# Patient Record
Sex: FEMALE | Race: WHITE
[De-identification: names, ages, dates, MRNs, and addresses within clinical notes are randomized per-mention and may not be internally consistent; named-entity substitution may affect disease eponyms.]

---

## 2019-06-10 ENCOUNTER — HOSPITAL ENCOUNTER (EMERGENCY)
Dept: HOSPITAL 74 - JER | Age: 24
Discharge: HOME | End: 2019-06-10
Payer: COMMERCIAL

## 2019-06-10 VITALS — DIASTOLIC BLOOD PRESSURE: 64 MMHG | HEART RATE: 84 BPM | SYSTOLIC BLOOD PRESSURE: 122 MMHG

## 2019-06-10 VITALS — TEMPERATURE: 98.6 F

## 2019-06-10 VITALS — BODY MASS INDEX: 29.9 KG/M2

## 2019-06-10 DIAGNOSIS — J06.9: ICD-10-CM

## 2019-06-10 DIAGNOSIS — B34.9: ICD-10-CM

## 2019-06-10 DIAGNOSIS — K52.9: Primary | ICD-10-CM

## 2019-06-10 LAB
ALBUMIN SERPL-MCNC: 4.5 G/DL (ref 3.4–5)
ALP SERPL-CCNC: 81 U/L (ref 45–117)
ALT SERPL-CCNC: 32 U/L (ref 13–61)
ANION GAP SERPL CALC-SCNC: 7 MMOL/L (ref 8–16)
APPEARANCE UR: CLEAR
AST SERPL-CCNC: 28 U/L (ref 15–37)
BACTERIA #/AREA URNS HPF: 271 /HPF
BASOPHILS # BLD: 0.6 % (ref 0–2)
BILIRUB SERPL-MCNC: 0.4 MG/DL (ref 0.2–1)
BILIRUB UR STRIP.AUTO-MCNC: NEGATIVE MG/DL
BUN SERPL-MCNC: 7.5 MG/DL (ref 7–18)
CALCIUM SERPL-MCNC: 9.4 MG/DL (ref 8.5–10.1)
CASTS #/AREA URNS LPF: 6 /LPF (ref 0–8)
CHLORIDE SERPL-SCNC: 101 MMOL/L (ref 98–107)
CO2 SERPL-SCNC: 28 MMOL/L (ref 21–32)
COLOR UR: YELLOW
CREAT SERPL-MCNC: 0.9 MG/DL (ref 0.55–1.3)
DEPRECATED RDW RBC AUTO: 13.9 % (ref 11.6–15.6)
EOSINOPHIL # BLD: 0.4 % (ref 0–4.5)
EPITH CASTS URNS QL MICRO: 8.4 /HPF
GLUCOSE SERPL-MCNC: 87 MG/DL (ref 74–106)
HCT VFR BLD CALC: 44.3 % (ref 32.4–45.2)
HGB BLD-MCNC: 14.8 GM/DL (ref 10.7–15.3)
KETONES UR QL STRIP: (no result)
LEUKOCYTE ESTERASE UR QL STRIP.AUTO: NEGATIVE
LYMPHOCYTES # BLD: 22.2 % (ref 8–40)
MCH RBC QN AUTO: 30 PG (ref 25.7–33.7)
MCHC RBC AUTO-ENTMCNC: 33.3 G/DL (ref 32–36)
MCV RBC: 89.9 FL (ref 80–96)
MONOCYTES # BLD AUTO: 20.5 % (ref 3.8–10.2)
NEUTROPHILS # BLD: 56.3 % (ref 42.8–82.8)
NITRITE UR QL STRIP: NEGATIVE
PH UR: 7 [PH] (ref 5–8)
PLATELET # BLD AUTO: 178 K/MM3 (ref 134–434)
PLATELET BLD QL SMEAR: NORMAL
PMV BLD: 9.6 FL (ref 7.5–11.1)
POTASSIUM SERPLBLD-SCNC: 3.6 MMOL/L (ref 3.5–5.1)
PROT SERPL-MCNC: 8.6 G/DL (ref 6.4–8.2)
PROT UR QL STRIP: (no result)
PROT UR QL STRIP: NEGATIVE
RBC # BLD AUTO: 4.93 M/MM3 (ref 3.6–5.2)
RBC # BLD AUTO: 7 /HPF (ref 0–4)
SODIUM SERPL-SCNC: 136 MMOL/L (ref 136–145)
SP GR UR: 1.02 (ref 1.01–1.03)
UROBILINOGEN UR STRIP-MCNC: 1 MG/DL (ref 0.2–1)
WBC # BLD AUTO: 5.4 K/MM3 (ref 4–10)
WBC # UR AUTO: 2 /HPF (ref 0–5)

## 2019-06-10 PROCEDURE — 3E0337Z INTRODUCTION OF ELECTROLYTIC AND WATER BALANCE SUBSTANCE INTO PERIPHERAL VEIN, PERCUTANEOUS APPROACH: ICD-10-PCS | Performed by: EMERGENCY MEDICINE

## 2019-06-10 NOTE — PDOC
History of Present Illness





- General


Chief Complaint: Pain


Stated Complaint: abd pain, sob, diarrhea, fever recent travel


Time Seen by Provider: 06/10/19 12:07


History Source: Patient


Exam Limitations: Clinical Condition





- History of Present Illness


Initial Comments: 





06/10/19 12:25


Patient with no significant past medical history present with complaint of five-

day history of nausea, vomiting, persistent cough with yellow sputum and 

diarrhea after traveling to [place] week ago. Patient reported feeling of 

weakness and whole body aches. Reports tactile fever but never checked her 

temperature. Denies sick contacts. Patient did not take anything for symptoms


Timing/Duration: other (5 days)





Past History





- Past Medical History


Allergies/Adverse Reactions: 


 Allergies











Allergy/AdvReac Type Severity Reaction Status Date / Time


 


No Known Allergies Allergy   Verified 06/10/19 11:40











Home Medications: 


Ambulatory Orders





Mag Hydrox/Al Hydrox/Simeth [Mylanta Suspension -] 30 ml PO Q6H #1 bottle 08/30/ 16 


Ranitidine HCl [Zantac] 150 mg PO DAILY #10 tablet 08/30/16 


Sucralfate [Carafate] 1 gm PO BID #20 tablet 08/30/16 


Benzonatate [Tessalon Pearls -] 100 mg PO TID PRN #21 capsule 06/10/19 


Ipratropium Bromide 2 spray NS BID PRN #1 spray 06/10/19 


Mag Hydrox/Aluminum Hyd/Simeth [Maalox Advanced Suspension] 30 ml PO Q8H PRN #

200 ml 06/10/19 








COPD: No


GI Disorders: Yes (ACID REFLUX)





- Suicide/Smoking/Psychosocial Hx


Smoking Status: No


Smoking History: Never smoked


Number of Cigarettes Smoked Daily: 0


Information on smoking cessation initiated: No


Hx Alcohol Use: No


Drug/Substance Use Hx: No


Substance Use Type: None





**Review of Systems





- Review of Systems


Able to Perform ROS?: Yes


Is the patient limited English proficient: No


Constitutional: Yes: Malaise, Weakness.  No: Chills, Fever


HEENTM: Yes: Symptoms Reported, See HPI, Nose Congestion.  No: Eye Pain, 

Blurred Vision, Tearing, Recent change in vision, Double Vision, Cataracts, Ear 

Pain, Ocular Prothesis, Ear Discharge, Nose Pain, Tinnitus, Nose Bleeding, 

Hearing Loss, Throat Pain, Throat Swelling, Mouth Pain, Dental Problems, 

Difficulty Swallowing, Mouth Swelling, Other


Respiratory: Yes: Symptoms reported, See HPI, Cough, Productive cough (yellow 

sputum).  No: Orthopnea, Shortness of Breath, SOB with Exertion, SOB at Rest, 

Stridor, Wheezing, Hemoptysis, Other


Cardiac (ROS): No: Symptoms Reported, See HPI, Chest Pain, Edema, Irregular 

Heart Rate, Lightheadedness, Palpitations, Syncope, Chest Tightness, Other


ABD/GI: Yes: See HPI, Diarrhea, Nausea, Vomiting, Abdominal cramping (

intermittent cramping pain).  No: Difficulty Swallowing, Poor Appetite, Poor 

Fluid Intake, Rectal Bleeding, Indigestion, Tarry Stools


Neurological: No: Headache, Numbness, Weakness, Dizziness


All Other Systems: Reviewed and Negative





*Physical Exam





- Vital Signs


 Last Vital Signs











Temp Pulse Resp BP Pulse Ox


 


 98.6 F   89   18   139/87   100 


 


 06/10/19 11:38  06/10/19 11:38  06/10/19 11:38  06/10/19 11:38  06/10/19 11:38














- Physical Exam


General Appearance: Yes: Nourished, Appropriately Dressed.  No: Apparent 

Distress





ED Treatment Course





- LABORATORY


CBC & Chemistry Diagram: 


 06/10/19 12:23





 06/10/19 12:23





- RADIOLOGY


Radiology Studies Ordered: 














 Category Date Time Status


 


 CHEST PA & LAT [RAD] Stat Radiology  06/10/19 12:15 Ordered














Medical Decision Making





- Medical Decision Making





06/10/19 12:26


Patient with no significant past medical history present with complaint of five-

day history of nausea, vomiting, persistent cough with yellow sputum and 

diarrhea after traveling to [place] week ago. Patient reported feeling of 

weakness and whole body aches. Reports tactile fever but never checked her 

temperature. Denies sick contacts. Patient did not take anything for symptoms


Clinical exam unremarkable with normal lung exam and no abdominal tenderness. 

Patient no acute distress.


Symptoms likely viral gastroenteritis versus URI versus pneumonia.


CBC, CMP, UA urine culture labs ordered. Urine hCG ordered. Chest x-ray ordered 

to rule out pneumonia. IV hydration with 1 L normal saline ordered. Pepcid 20 

mg IV ordered.


Reassess after lab and imaging


06/10/19 13:54


CBC, CMP, Urine labs wnl. CXR with  no acute pathology. uriine hcg negative.


Patient report feeling better after IV hydration. Patient symptoms likely viral 

syndrome. Patient stable for outpatient management with increased hydration, 

tesson perles for cough with PCP f/u. 








*DC/Admit/Observation/Transfer


Diagnosis at time of Disposition: 


 Gastroenteritis, Viral syndrome





URI (upper respiratory infection)


Qualifiers:


 URI type: unspecified URI Qualified Code(s): J06.9 - Acute upper respiratory 

infection, unspecified








- Discharge Dispostion


Disposition: HOME


Condition at time of disposition: Stable


Decision to Admit order: No





- Prescriptions


Prescriptions: 


Benzonatate [Tessalon Pearls -] 100 mg PO TID PRN #21 capsule


 PRN Reason: Cough


Ipratropium Bromide 2 spray NS BID PRN #1 spray


 PRN Reason: nasal congestion


Mag Hydrox/Aluminum Hyd/Simeth [Maalox Advanced Suspension] 30 ml PO Q8H PRN #

200 ml


 PRN Reason: abdominal discomfort





- Referrals





- Patient Instructions


Printed Discharge Instructions:  DI for Viral Gastroenteritis -- Adult, DI for 

Viral Upper Respiratory Infection -- Adult, Gastroenteritis Diet


Additional Instructions: 


Take medications as prescribed. Increase fluid intake. Follow-up with PCP in 

few days for reassessment 





- Post Discharge Activity

## 2019-06-11 NOTE — EKG
Test Reason : 

Blood Pressure : ***/*** mmHG

Vent. Rate : 082 BPM     Atrial Rate : 082 BPM

   P-R Int : 156 ms          QRS Dur : 094 ms

    QT Int : 384 ms       P-R-T Axes : 058 034 043 degrees

   QTc Int : 448 ms

 

NORMAL SINUS RHYTHM

RSR' OR QR PATTERN IN V1 SUGGESTS RIGHT VENTRICULAR CONDUCTION DELAY

BORDERLINE ECG

NO PREVIOUS ECGS AVAILABLE

Confirmed by Chano Jimenez MD ([id number]) on 6/11/2019 12:47:51 PM

 

Referred By:             Confirmed By:Chano Jimenez MD

## 2019-06-13 ENCOUNTER — HOSPITAL ENCOUNTER (EMERGENCY)
Dept: HOSPITAL 74 - JER | Age: 24
Discharge: HOME | End: 2019-06-13
Payer: COMMERCIAL

## 2020-02-09 ENCOUNTER — HOSPITAL ENCOUNTER (EMERGENCY)
Dept: HOSPITAL 74 - JER | Age: 25
Discharge: HOME | End: 2020-02-09
Payer: COMMERCIAL

## 2020-02-09 VITALS — TEMPERATURE: 100 F | HEART RATE: 103 BPM | DIASTOLIC BLOOD PRESSURE: 65 MMHG | SYSTOLIC BLOOD PRESSURE: 139 MMHG

## 2020-02-09 VITALS — BODY MASS INDEX: 29.2 KG/M2

## 2020-02-09 DIAGNOSIS — J32.8: Primary | ICD-10-CM

## 2020-02-09 NOTE — PDOC
History of Present Illness





- General


Chief Complaint: Respiratory


Stated Complaint: SENT BY DOCTOR


Time Seen by Provider: 02/09/20 17:18


History Source: Patient





- History of Present Illness


Timing/Duration: reports: constant





Past History





- Past Medical History


Allergies/Adverse Reactions: 


 Allergies











Allergy/AdvReac Type Severity Reaction Status Date / Time


 


No Known Allergies Allergy   Verified 02/09/20 17:11











Home Medications: 


Ambulatory Orders





Mag Hydrox/Al Hydrox/Simeth [Mylanta Suspension -] 30 ml PO Q6H #1 bottle 08/30/ 16 


Ranitidine HCl [Zantac] 150 mg PO DAILY #10 tablet 08/30/16 


Sucralfate [Carafate] 1 gm PO BID #20 tablet 08/30/16 


Benzonatate [Tessalon Pearls -] 100 mg PO TID PRN #21 capsule 06/10/19 


Ipratropium Bromide 2 spray NS BID PRN #1 spray 06/10/19 


Mag Hydrox/Aluminum Hyd/Simeth [Maalox Advanced Suspension] 30 ml PO Q8H PRN #

200 ml 06/10/19 


Azithromycin [Zithromax Tri-Bravo (3 DAYS) -] 500 mg PO DAILY #3 tablet 06/13/19 


Loratadine [Claritin] 10 mg PO DAILY #7 tablet 06/13/19 


Amoxicillin/Potassium Clav [Augmentin 875-125 Tablet] 1 each PO BID #14 tablet 

02/09/20 








COPD: No


GI Disorders: Yes (ACID REFLUX)





- Immunization History


Immunization Up to Date: Yes





- Psycho Social/Smoking Cessation Hx


Smoking Status: No


Smoking History: Never smoked


Number of Cigarettes Smoked Daily: 0


Hx Alcohol Use: No


Drug/Substance Use Hx: No


Substance Use Type: None





**Review of Systems





- Review of Systems


Constitutional: Yes: Fever, Malaise


HEENTM: No: Ear Pain, Throat Pain


Respiratory: Yes: Cough, Shortness of Breath.  No: Wheezing


Cardiac (ROS): No: Chest Pain





*Physical Exam





- Vital Signs


 Last Vital Signs











Temp Pulse Resp BP Pulse Ox


 


 100.0 F H  103 H  20   139/65   99 


 


 02/09/20 17:08  02/09/20 17:08  02/09/20 17:08  02/09/20 17:08  02/09/20 17:08














- Physical Exam





02/09/20 17:29


coughing in facility


General Appearance: Yes: Appropriately Dressed


HEENT: positive: Normal ENT Inspection, Normal Voice, TMs Normal, Pharynx Normal

, Sinus Tenderness (over b/l frontal sinuses).  negative: Scleral Icterus (R), 

Scleral Icterus (L)


Neck: positive: Supple.  negative: Lymphadenopathy (R), Lymphadenopathy (L)


Respiratory/Chest: positive: Lungs Clear, Normal Breath Sounds.  negative: 

Respiratory Distress


Cardiovascular: positive: Regular Rate, S1, S2


Integumentary: positive: Dry, Warm


Neurologic: positive: Fully Oriented, Alert, Normal Mood/Affect





Medical Decision Making





- Medical Decision Making





02/09/20 17:21





23 yo F, denies any past medical history,  here with persistent cough with low 

grade fever and possible shortness of breathx3 weeks.  Was seen in urgent care 

a total of 3 times and told she might have the flu though was never swabbed.  

Was told to take over-the-counter meds at that time.  On second visit was told 

possible sinusitis but was unable to fill prescriptions for antibiotics due to 

insurance issues.  When she returned to urgent care the third time was told not 

to take antibiotics and was instead started on a prednisone taper which she is 

currently taking.  Patient does report some bilateral frontal sinus pain and 

headache. States no CXR was taken, No h/o PNA, No tob use





see exam





R/o PNA


Exam remarkable for low-grade fever with heart rate of 103 and ttp over b/l 

frontal sinuses, chest/lungs clear


-dose of tylenol


-CXR








02/09/20 17:45


CXR negative. Will dc w/ abx for possible bacterial sinusitis. To f/u with PMD.








Discharge





- Discharge Information


Problems reviewed: Yes


Clinical Impression/Diagnosis: 


 Rhinosinusitis





Fever


Qualifiers:


 Fever type: unspecified Qualified Code(s): R50.9 - Fever, unspecified





Condition: Good


Disposition: HOME





- Additional Discharge Information


Prescriptions: 


Amoxicillin/Potassium Clav [Augmentin 875-125 Tablet] 1 each PO BID #14 tablet





- Follow up/Referral





- Patient Discharge Instructions


Patient Printed Discharge Instructions:  Sinusitis


Additional Instructions: 


Your cxr was normal


You were started on antibotics for possible sinusitis


Please follow up with your PMD





- Post Discharge Activity


Work/Back to School Note:  Back to Work